# Patient Record
Sex: MALE | Race: WHITE
[De-identification: names, ages, dates, MRNs, and addresses within clinical notes are randomized per-mention and may not be internally consistent; named-entity substitution may affect disease eponyms.]

---

## 2019-11-05 ENCOUNTER — HOSPITAL ENCOUNTER (EMERGENCY)
Dept: HOSPITAL 12 - ER | Age: 49
Discharge: HOME | End: 2019-11-05
Payer: MEDICAID

## 2019-11-05 VITALS — BODY MASS INDEX: 23.11 KG/M2 | HEIGHT: 69 IN | WEIGHT: 156 LBS

## 2019-11-05 VITALS — SYSTOLIC BLOOD PRESSURE: 126 MMHG | DIASTOLIC BLOOD PRESSURE: 81 MMHG

## 2019-11-05 DIAGNOSIS — M25.562: ICD-10-CM
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
## 2019-11-05 DIAGNOSIS — Z79.1: ICD-10-CM

## 2019-11-05 DIAGNOSIS — H57.89: Primary | ICD-10-CM
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
## 2019-11-05 DIAGNOSIS — G89.29: ICD-10-CM

## 2019-11-05 PROCEDURE — A4663 DIALYSIS BLOOD PRESSURE CUFF: HCPCS

## 2019-11-05 NOTE — NUR
-------------------------------------------------------------------------------

          *** Note undone in EDM - 11/05/19 at 1300 by BREANNE ***          

-------------------------------------------------------------------------------

Patient discharged to home in stable conditon.  Written and verbal after care 
instructions given. 

Patient verbalizes understanding of instructions.
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
